# Patient Record
Sex: FEMALE | Race: ASIAN | NOT HISPANIC OR LATINO
[De-identification: names, ages, dates, MRNs, and addresses within clinical notes are randomized per-mention and may not be internally consistent; named-entity substitution may affect disease eponyms.]

---

## 2022-12-09 ENCOUNTER — APPOINTMENT (OUTPATIENT)
Dept: UROLOGY | Facility: CLINIC | Age: 62
End: 2022-12-09

## 2022-12-09 PROBLEM — Z00.00 ENCOUNTER FOR PREVENTIVE HEALTH EXAMINATION: Status: ACTIVE | Noted: 2022-12-09

## 2023-04-21 ENCOUNTER — APPOINTMENT (OUTPATIENT)
Dept: CARDIOLOGY | Facility: CLINIC | Age: 63
End: 2023-04-21
Payer: MEDICAID

## 2023-04-21 ENCOUNTER — NON-APPOINTMENT (OUTPATIENT)
Age: 63
End: 2023-04-21

## 2023-04-21 VITALS
HEIGHT: 61 IN | BODY MASS INDEX: 27.56 KG/M2 | TEMPERATURE: 97.6 F | DIASTOLIC BLOOD PRESSURE: 71 MMHG | SYSTOLIC BLOOD PRESSURE: 110 MMHG | RESPIRATION RATE: 18 BRPM | WEIGHT: 146 LBS | OXYGEN SATURATION: 97 % | HEART RATE: 88 BPM

## 2023-04-21 DIAGNOSIS — R60.9 EDEMA, UNSPECIFIED: ICD-10-CM

## 2023-04-21 DIAGNOSIS — Z78.9 OTHER SPECIFIED HEALTH STATUS: ICD-10-CM

## 2023-04-21 DIAGNOSIS — R73.03 PREDIABETES.: ICD-10-CM

## 2023-04-21 DIAGNOSIS — R00.2 PALPITATIONS: ICD-10-CM

## 2023-04-21 PROCEDURE — 99204 OFFICE O/P NEW MOD 45 MIN: CPT | Mod: 25

## 2023-04-21 PROCEDURE — 93000 ELECTROCARDIOGRAM COMPLETE: CPT

## 2023-04-28 ENCOUNTER — APPOINTMENT (OUTPATIENT)
Dept: CARDIOLOGY | Facility: CLINIC | Age: 63
End: 2023-04-28
Payer: MEDICAID

## 2023-04-28 VITALS
RESPIRATION RATE: 18 BRPM | HEART RATE: 83 BPM | TEMPERATURE: 97.5 F | DIASTOLIC BLOOD PRESSURE: 78 MMHG | SYSTOLIC BLOOD PRESSURE: 127 MMHG | OXYGEN SATURATION: 97 %

## 2023-04-28 PROCEDURE — 93306 TTE W/DOPPLER COMPLETE: CPT

## 2023-05-03 NOTE — REVIEW OF SYSTEMS
[Lower Ext Edema] : lower extremity edema [Palpitations] : palpitations [Negative] : Heme/Lymph [SOB] : no shortness of breath [Dyspnea on exertion] : not dyspnea during exertion [Chest Discomfort] : no chest discomfort [Leg Claudication] : no intermittent leg claudication [Orthopnea] : no orthopnea [PND] : no PND [Syncope] : no syncope

## 2023-05-03 NOTE — ASSESSMENT
[FreeTextEntry1] : 62 year old F with history of pre-DM who presents for evaluation of ?edema and palpitations.\par \par Pt states that she suffers from lower back pain, requiring pain medications from PCP. She hasn't been able to exercise as much due to LBP so she is unclear if her exercise tolerance has changed. She recently went back to work, feels more anxious and has been having intermittent palpitations. It is not a daily occurrence. It doesn't have clear triggers and happens 3-4 times/week. It usually lasts about 10 minutes but sometimes could be more. She drinks 1 cup of black coffee each morning but no energy drinks. She also feels like her legs and face are swollen in the morning, which improve during the day.  No CP, SOB, orthopnea, PND, lightheadedness, or LOC.\par \par 1) palpitations\par 2) Edema,\par - ECG showed normal sinus rhythm with normal intervals\par - Pt appears clinically euvolemic on exam\par - Given not a daily occurrence of palpitations, I advised 1 week event monitor to r/o arrhythmia\par - Given reports of lower extremity swelling and her palpitations, I advised TTE to r/o LV dysfunction, r/o structural heart disease, and r/o valvular pathology, which showed normal LVEF 70%, normal LV diastolic function, normal RV size/function, and mild TR\par \par 3) Follow-up, pending event monitor results

## 2023-05-03 NOTE — HISTORY OF PRESENT ILLNESS
[FreeTextEntry1] : 62 year old F with history of pre-DM who presents for evaluation of ?edema and palpitations.\par \par Pt states that she suffers from lower back pain, requiring pain medications from PCP. She hasn't been able to exercise as much due to LBP so she is unclear if her exercise tolerance has changed. She recently went back to work, feels more anxious and has been having intermittent palpitations. It is not a daily occurrence. It doesn't have clear triggers and happens 3-4 times/week. It usually lasts about 10 minutes but sometimes could be more. She drinks 1 cup of black coffee each morning but no energy drinks. She also feels like her legs and face are swollen in the morning, which improve during the day.  No CP, SOB, orthopnea, PND, lightheadedness, or LOC.\par \par

## 2023-05-05 ENCOUNTER — NON-APPOINTMENT (OUTPATIENT)
Age: 63
End: 2023-05-05